# Patient Record
Sex: FEMALE | Race: WHITE | Employment: FULL TIME | URBAN - METROPOLITAN AREA
[De-identification: names, ages, dates, MRNs, and addresses within clinical notes are randomized per-mention and may not be internally consistent; named-entity substitution may affect disease eponyms.]

---

## 2023-01-27 ENCOUNTER — HOSPITAL ENCOUNTER (OUTPATIENT)
Dept: PHYSICAL THERAPY | Age: 43
Discharge: HOME | End: 2023-01-27
Payer: OTHER GOVERNMENT

## 2023-01-27 PROCEDURE — 97162 PT EVAL MOD COMPLEX 30 MIN: CPT

## 2023-01-27 PROCEDURE — 97110 THERAPEUTIC EXERCISES: CPT

## 2023-01-27 PROCEDURE — 97535 SELF CARE MNGMENT TRAINING: CPT

## 2023-01-27 NOTE — PROGRESS NOTES
Physical Therapy Evaluation        Patient Name: Wale Rojo  INIT:  : 1980  [x]  Patient  Verified  Payor:  / Plan: [facility] REGION / Product Type:  /    In time:331  Out time:427  Total Treatment Time (min): 64  Visit #: 1 of 12    Medicare/BCBS Only   Total Timed Codes (min):  56 1:1 Treatment Time:  64       Treatment Area: Stress incontinence (female) (male) [N39.3]    SUBJECTIVE  Pain Level (0-10 scale): 0/10  []constant []intermittent []improving []worsening []no change since onset    Any medication changes, allergies to medications, adverse drug reactions, diagnosis change, or new procedure performed?: [x] No    [] Yes (see summary sheet for update)  Subjective functional status/changes:     PLOF: active lifestyle, walk several days/week  Limitations to PLOF: can't sneeze/cough/laugh, run, or jump without leakage  Mechanism of Injury: c- section 2010   Current symptoms/Complaints: UI with sneeze/cough/laugh, run, or jump  Previous Treatment/Compliance: no  PMHx/Surgical Hx: 5 pregnancies/2 vaginal with episiotomy and 3rd degree with 2nd,  1 , 2 miscarriage D&C, lacrimal gland surgery,   Work Hx: [employer] for [employer]  Living Situation: live with  and 3 children (24, 25, and 12)  Pt Goals: to not pee on myself  Barriers: []pain []financial []time []transportation []other  Motivation: very  Substance use: []Alcohol [x]Tobacco []other:   Cognition: A & O x 3    Other:    Current urinary complaint  leaks with activity (stress induced)  urinary frequency  stress incontinence    Bladder complaint longevity:  5+ years    Bladder symptom progression:  worsened    Frequency of UI: 5-7 times per day    Pad use:  pantiliners: 2- 3 per day    Pad wetness when changed:  wet    Daytime urinary frequency:  Every 2-3 hour(s) during the day    Nocturia:  0x/ night    Patient has failed previous pelvic floor muscle training?   [] Yes    [x] No    Bowel function:  Regular BM, 5-7 per week  Stool Type (Preble): 1-4  Toileting position/modifications: stool    Fecal Incontinence present? none    Pain complaint:  none    Pain scale:  N/A    Pain description:  NA    Diet: low fruit    Fluid intake:    Fluid  Amount per day   Water 34 ounces   Caffeine 64 ounces  Mountain Dew (Diet)   Alcohol none                 Other Tests / Comments: KHADIJAH assessment: with head raise 3 fingers at umbilicus; 1\" above umbilicus 3 fingers; 1\" below umbilicus 3 fingers; poor tensile force noted and no abdominal doming noted with transfers/head raise     Infrasternal angle =   increased     Pelvic Floor Assessment  Patient was educated on pelvic floor anatomy, structure, and function and implications for current presentation of s/s. Patient consents to pelvic floor assessment.        External trigger point/ muscle tenderness:    Superficial PFM tenderness/restriction   Right/center Left   Bulbocavernosus (bulbospongiosus) none none   Ischiocavernosus none none   Superficial Transverse Perineal none none   Perineal body none    Levator Ani none none              PFM Screen:    Skin Integrity:  [x] Healthy [] Red  [] Labia Atrophy [] Fragile    Sensation: [x] Intact [] Diminished:    Muscle Bulk: [x] Symmetrical  [] Well-developed [] Atrophied:  []L   []R   []B    Prolapse: None visualized    Ability to actively bulge: yes  Bulge with cough yes; bulge no with knack prior to cough    Pelvic floor manual exam: Performed via internal digital vaginal assessment  female-upon vaginal palpation of the pelvic floor, the following was noted: good pelvic floor tone and tension with no pain upon palpation  Introitus restriction/TTP (reported as hands on a clock):   Mild TTP/restriction: 8 O'clock 1/10 pain    Deep PFM Tenderness/Restriction:    Right/center Left   pubococcygeus none none   Ischiococcygeus none none   Iliococcygeus none none   Obturator Internus none none   coccyx none             Pelvic floor MMT    PERF (Performance/Endurance/Repetitions//Flicks): 4/5/5//3  gluteal substitution noted    Pelvic muscle release pattern  2 - partial, delayed release        25 min [x]Eval                  []Re-Eval       23 min Self Care: Review and handout provided on the following: PFM anatomy, structure and function as it pertains to current s/s, complaints and condition. Reviewed expectations for PFPT and POC. Bladder fitness, HEP, KPFE, urge suppression, bowel routine, fibers, bowl massage   Rationale:  Increase awareness and understanding of current condition to improve patients ability to independently and effectively attain goals and progress towards long term management of current condition. 8 min Therapeutic Exercise:  [] See flow sheet :   Rationale: increase strength and improve coordination to improve the patients ability to maintain continence       With   [] TE   [] TA   [] neuro   [] other: Patient Education: [x] Review HEP    [] Progressed/Changed HEP based on:   [] positioning   [] body mechanics   [] transfers   [] heat/ice application    [] other:           Pain Level (0-10 scale) post treatment: 0/10    ASSESSMENT/Changes in Function: Patient is a 38year old female presenting to Physical Therapy with c/o stress urinary incontinence which is limiting ability function at previous level. Patient has slight pain complaints with palpation at 8 O'clock at introitus. Patient presents with decreased strength, coordination, and endurance of the pelvic floor muscles and decreased strength of postural stabilizers. Patient presents with limited understanding of bladder and bowel anatomy/function, dietary irritants, and urge suppression. I feel patient would benefit from skilled therapeutic intervention to optimize highest functional level possible.      Patient will continue to benefit from skilled PT services to modify and progress therapeutic interventions, address functional mobility deficits, address ROM deficits, address strength deficits, analyze and address soft tissue restrictions, analyze and cue movement patterns, analyze and modify body mechanics/ergonomics, and assess and modify postural abnormalities to attain remaining goals. [x]  See Plan of Care  []  See progress note/recertification  []  See Discharge Summary         Progress towards goals / Updated goals:  Short term goals: To be achieved in 6 weeks:  Patient will demonstrate proper dietary/fluid habits and urge suppression strategies that promote bladder and bowel health to aid in management of urinary urgency and incontinence. Status at eval: Patient consuming insufficient water (34 ounces) and too much Diet Mountain Dew (64 ounces)  and unaware of bladder fitness, dietary irritants and urge suppression strategies. Patient will report improvement in UI complaints evidence by a decrease in pad usage and/or amount of leakage by 50% to improve QOL. Status at eval: Patient using 2-3 pads (pantiliners) per day, generally wet (amount of leakage). Patient will be able to maintain pelvic floor contraction for 8 seconds, 8 repetitions with no accessory substitution or breath holding. Status at eval: PFMC 3 seconds, 7 repetitions with gluteal substitutions and breath holding    Patient will report urinating every 3 hours to improve QOL  Status at eval:  2-3 hours    Patient will report bowel movement consistency as 2-4 on the [place] scale to improve QOL  Status at eval: 1-4  Long term goals: To be achieved in 12 weeks:  Patient will report bladder continence 75% of the time with cough/sneeze/laugh and walking to the toilet. Status at eval: patient stress and urgency incontinence 5-7 times  per day    Patient will demonstrate pelvic floor muscle contraction 4/5 and ability to maintain contraction for 10 seconds, 10 repetitions.    Status at eval: PFM 3/5, 3 second hold, 7 repetitions, with gluteal substitution    Patient will demonstrate improvement of current complaints evidenced by the expected point  improvement in FOTO, Pelvic functional disability index score  Status at Eval: Patient attempted to take the FOTO but it timed out on her    Patient will report bowel movement consistency as 4 on the [place] scale to improve QOL  Status at eval: 1-4    Patient will demonstrate independence with management tools and exercise program that are beneficial for current condition in order to feel comfortable with Pelvic floor PT D/C and not fear exacerbation of current condition or symptoms returning.    Status at eval : patient is unaware of what exercises to do to decrease the symptoms and unaware of what activities to avoid to avoid exacerbation of current condition    PLAN  []  Upgrade activities as tolerated     [x]  Continue plan of care  []  Update interventions per flow sheet       []  Discharge due to:_  []  Other:_      Fredy Briceño PT 1/27/2023  3:24 PM

## 2023-01-27 NOTE — PROGRESS NOTES
[facility]  [address], [address]  Ph [phone]  Fx [phone]    Plan of Care/ Statement of Necessity for Physical Therapy Services    Patient name: Melissa Trevino Start of Care: 2023   Referral source: Quan Wang NP : 1980    Medical Diagnosis: Stress incontinence (female) (male) [N39.3]   Onset Date:2010    Treatment Diagnosis: Stress incontinence (female) (male) [N39.3]   Prior Hospitalization: see medical history Provider#: [id number]   Medications: Verified on Patient summary List    Comorbidities: 5 pregnancies/2 vaginal with episiotomy and 3rd degree with 2nd,  1 , 2 miscarriage D&C, lacrimal gland surgery   Prior Level of Function: active lifestyle, walk several days/week          The Plan of Care and following information is based on the information from the initial evaluation. Assessment/ key information: Patient is a 38year old female presenting to Physical Therapy with c/o stress urinary incontinence which is limiting ability function at previous level. Patient has slight pain complaints with palpation at 8 O'clock at introitus. Patient presents with decreased strength, coordination, and endurance of the pelvic floor muscles and decreased strength of postural stabilizers. Patient presents with limited understanding of bladder and bowel anatomy/function, dietary irritants, and urge suppression. I feel patient would benefit from skilled therapeutic intervention to optimize highest functional level possible. Patient will continue to benefit from skilled PT services to modify and progress therapeutic interventions, address functional mobility deficits, address ROM deficits, address strength deficits, analyze and address soft tissue restrictions, analyze and cue movement patterns, analyze and modify body mechanics/ergonomics, and assess and modify postural abnormalities to attain remaining goals.     Evaluation Complexity History HIGH Complexity :3+ comorbidities / personal factors will impact the outcome/ POC ; Examination HIGH Complexity : 4+ Standardized tests and measures addressing body structure, function, activity limitation and / or participation in recreation  ;Presentation MEDIUM Complexity : Evolving with changing characteristics  ; Overall Complexity Rating: MEDIUM    Problem List: Decreased pelvic floor mm awareness, Decreased pelvic floor mm strength, Use of accessory muscles, and Improper voiding habits  Treatment Plan may include any combination of the following: Therapeutic exercise, Urge suppression techniques, Neuromuscular re-education, Manual therapy, Physical agent/modality, and Patient education  Patient / Family readiness to learn indicated by: asking questions, trying to perform skills, and interest  Persons(s) to be included in education: patient (P)  Barriers to Learning/Limitations: None  Measures taken if barriers to learning: none  Patient Goal (s): to not pee on myself  Patient Self Reported Health Status: good  Rehabilitation Potential: excellent    Short term goals: To be achieved in 6 weeks:  Patient will demonstrate proper dietary/fluid habits and urge suppression strategies that promote bladder and bowel health to aid in management of urinary urgency and incontinence. Status at eval: Patient consuming insufficient water (34 ounces) and too much Diet Mountain Dew (64 ounces)  and unaware of bladder fitness, dietary irritants and urge suppression strategies. Patient will report improvement in UI complaints evidence by a decrease in pad usage and/or amount of leakage by 50% to improve QOL. Status at eval: Patient using 2-3 pads (pantiliners) per day, generally wet (amount of leakage). Patient will be able to maintain pelvic floor contraction for 8 seconds, 8 repetitions with no accessory substitution or breath holding.   Status at eval: PFMC 3 seconds, 7 repetitions with gluteal substitutions and breath holding     Patient will report urinating every 3 hours to improve QOL  Status at eval:  2-3 hours     Patient will report bowel movement consistency as 2-4 on the [place] scale to improve QOL  Status at eval: 1-4  Long term goals: To be achieved in 12 weeks:  Patient will report bladder continence 75% of the time with cough/sneeze/laugh and walking to the toilet. Status at eval: patient stress and urgency incontinence 5-7 times  per day     Patient will demonstrate pelvic floor muscle contraction 4/5 and ability to maintain contraction for 10 seconds, 10 repetitions. Status at eval: PFM 3/5, 3 second hold, 7 repetitions, with gluteal substitution     Patient will demonstrate improvement of current complaints evidenced by the expected point  improvement in FOTO, Pelvic functional disability index score  Status at Eval: Patient attempted to take the FOTO but it timed out on her     Patient will report bowel movement consistency as 4 on the [place] scale to improve QOL  Status at eval: 1-4     Patient will demonstrate independence with management tools and exercise program that are beneficial for current condition in order to feel comfortable with Pelvic floor PT D/C and not fear exacerbation of current condition or symptoms returning. Status at eval : patient is unaware of what exercises to do to decrease the symptoms and unaware of what activities to avoid to avoid exacerbation of current condition     Frequency / Duration: Patient to be seen 1 times per week for 12 weeks.     Patient/ Caregiver education and instruction: Diagnosis, prognosis, Proper Voiding Habits, Diet, Exercises, and Bladder Retraining   [x]  Plan of care has been reviewed with PTA    Certification Period: nazia Garcia, PT 1/27/2023 4:37 PM    ________________________________________________________________________    I certify that the above Therapy Services are being furnished while the patient is under my care. I agree with the treatment plan and certify that this therapy is necessary.     [de-identified] Signature:____________________  Date:____________Time:____________                                      Chester Herrera NP      Please sign and return to [facility]  [address] Cyn Lopez, [address]  Ph [phone]  Fx [phone]

## 2023-02-07 ENCOUNTER — HOSPITAL ENCOUNTER (OUTPATIENT)
Dept: PHYSICAL THERAPY | Age: 43
Discharge: HOME | End: 2023-02-07
Payer: OTHER GOVERNMENT

## 2023-02-07 PROCEDURE — 97110 THERAPEUTIC EXERCISES: CPT

## 2023-02-07 PROCEDURE — 97112 NEUROMUSCULAR REEDUCATION: CPT

## 2023-02-07 PROCEDURE — 9990 CHARGE CONVERSION

## 2023-02-07 NOTE — PROGRESS NOTES
PT DAILY TREATMENT NOTE    Patient Name: Willie Valencia  [id number]  : 1980  [x]  Patient  Verified  Payor:  / Plan: [facility] REGION / Product Type:  /    In time:5:30  Out time:6:23  Total Treatment Time (min): 53  Total Timed Codes (min): 48  Visit #: 2 of 12    Treatment Dx: Stress incontinence (female) (male) [N39.3]    SUBJECTIVE  Pain Level (0-10 scale): 0  Any medication changes, allergies to medications, adverse drug reactions, diagnosis change, or new procedure performed?: [x] No    [] Yes (see summary sheet for update)  Subjective functional status/changes:   [] No changes reported  Pt reports she has been able to hold back leaking a few times while sneezing. OBJECTIVE    25 min Therapeutic Activity: sEMG BFB testing for note of impairment []  See flow sheet :   Rationale: see above for rationale     30 min Neuromuscular Re-education:  [x]  See flow sheet :   Rationale: increase strength, improve coordination, and increase proprioception  to improve the patients ability to activate pelvic floor and core          With   [] TE   [x] TA   [x] neuro   [] other: Patient Education: [x] Review HEP    [] Progressed/Changed HEP based on:   [] positioning   [] body mechanics   [] transfers   [] heat/ice application    [] other:      Other Objective/Functional Measures:     sEMG Biofeedback Results:    Supine:   2s on/4s off   work average:  8.4 rest average: 4.2  5s on/10s off   work average:  7.4 rest average: 3.9  Seated:  2s on/4s off   work average:  3.9 rest average: 2.6  5s on/10s off   work average:  3.3 rest average: 2.2    Pain Level (0-10 scale) post treatment: 0    ASSESSMENT/Changes in Function: Patient tolerated treatment session well today. Patient had no complaints with addition of seated pelvic floor with overflow to exercise program to accomplish improved pelvic floor recruitment in sitting.   Patient continues to make good progress toward goals and would benefit from continued skilled PT intervention to address remaining deficits outlined in goals below. Patient will continue to benefit from skilled PT services to modify and progress therapeutic interventions, address functional mobility deficits, address ROM deficits, address strength deficits, analyze and address soft tissue restrictions, analyze and cue movement patterns, analyze and modify body mechanics/ergonomics, assess and modify postural abnormalities, and instruct in home and community integration to attain remaining goals. [x]  See Plan of Care  []  See progress note/recertification  []  See Discharge Summary         Progress towards goals / Updated goals:  Short term goals: To be achieved in 6 weeks:  Patient will demonstrate proper dietary/fluid habits and urge suppression strategies that promote bladder and bowel health to aid in management of urinary urgency and incontinence. Status at eval: Patient consuming insufficient water (34 ounces) and too much Diet Mountain Dew (64 ounces)  and unaware of bladder fitness, dietary irritants and urge suppression strategies. Current: pt increased her water intake by 16 oz and decreasing mountain dew intake by about 16 oz     Patient will report improvement in UI complaints evidence by a decrease in pad usage and/or amount of leakage by 50% to improve QOL. Status at eval: Patient using 2-3 pads (pantiliners) per day, generally wet (amount of leakage). Current: pt reports that there were a couple of times where she sneezed and didn't pee 2/7/2023 progressing     Patient will be able to maintain pelvic floor contraction for 8 seconds, 8 repetitions with no accessory substitution or breath holding.   Status at eval: PFMC 3 seconds, 7 repetitions with gluteal substitutions and breath holding  Current: pt reports the contractions are getting easier, she is able hold it longer 2/7/2023 progressing     Patient will report urinating every 3 hours to improve QOL  Status at eval:  2-3 hours     Patient will report bowel movement consistency as 2-4 on the [place] scale to improve QOL  Status at eval: 1-4    Long term goals: To be achieved in 12 weeks:  Patient will report bladder continence 75% of the time with cough/sneeze/laugh and walking to the toilet. Status at eval: patient stress and urgency incontinence 5-7 times  per day     Patient will demonstrate pelvic floor muscle contraction 4/5 and ability to maintain contraction for 10 seconds, 10 repetitions. Status at eval: PFM 3/5, 3 second hold, 7 repetitions, with gluteal substitution     Patient will demonstrate improvement of current complaints evidenced by the expected point  improvement in FOTO, Pelvic functional disability index score  Status at Eval: Patient attempted to take the FOTO but it timed out on her     Patient will report bowel movement consistency as 4 on the [place] scale to improve QOL  Status at eval: 1-4     Patient will demonstrate independence with management tools and exercise program that are beneficial for current condition in order to feel comfortable with Pelvic floor PT D/C and not fear exacerbation of current condition or symptoms returning. Status at eval : patient is unaware of what exercises to do to decrease the symptoms and unaware of what activities to avoid to avoid exacerbation of current condition    PLAN  []  Upgrade activities as tolerated     [x]  Continue plan of care  []  Update interventions per flow sheet       []  Discharge due to:_  []  Other:_      Charles Fuentes PT 2/7/2023  5:35 PM    No future appointments.

## 2023-02-27 ENCOUNTER — HOSPITAL ENCOUNTER (OUTPATIENT)
Facility: HOSPITAL | Age: 43
Setting detail: RECURRING SERIES
End: 2023-02-27
Payer: OTHER GOVERNMENT

## 2023-02-27 ENCOUNTER — TELEPHONE (OUTPATIENT)
Facility: HOSPITAL | Age: 43
End: 2023-02-27

## 2023-03-06 ENCOUNTER — TELEPHONE (OUTPATIENT)
Facility: HOSPITAL | Age: 43
End: 2023-03-06

## 2023-03-06 ENCOUNTER — APPOINTMENT (OUTPATIENT)
Facility: HOSPITAL | Age: 43
End: 2023-03-06
Payer: OTHER GOVERNMENT

## 2023-03-13 ENCOUNTER — HOSPITAL ENCOUNTER (OUTPATIENT)
Facility: HOSPITAL | Age: 43
Setting detail: RECURRING SERIES
Discharge: HOME | End: 2023-03-16
Payer: OTHER GOVERNMENT

## 2023-03-13 PROCEDURE — 97530 THERAPEUTIC ACTIVITIES: CPT

## 2023-03-13 PROCEDURE — 97112 NEUROMUSCULAR REEDUCATION: CPT

## 2023-03-13 NOTE — PROGRESS NOTES
PHYSICAL / OCCUPATIONAL THERAPY - DAILY TREATMENT NOTE (updated )    Patient Name: Cassandra Mead    Date: 3/13/2023    : 1980  Insurance: Payor:  EAST / Plan: [employer] EAST / Product Type: *No Product type* /      Patient  verified Yes     Visit #   Current / Total 3 12   Time   In / Out 5:30 6:23   Pain   In / Out 0 0   Subjective Functional Status/Changes: Pt reports she feels like the leaking is a little bit better. She hasn't kept up with the exercises. She's ready to get back into it now. She discovered that the first two days of her period make her very weak. Changes to:  Meds, Allergies, Med Hx, Sx Hx? If yes, update Summary List no       TREATMENT AREA =  No admission diagnoses are documented for this encounter. OBJECTIVE    Therapeutic Procedures: Tx Min Billable or 1:1 Min (if diff from Tx Min) Procedure, Rationale, Specifics   23  58120 Therapeutic Activity (timed):  use of dynamic activities replicating functional movements to increase ROM, strength, coordination, balance, and proprioception in order to improve patient's ability to progress to PLOF and address remaining functional goals. (see flow sheet as applicable)     Details if applicable:  discussion of symptoms and progress towards POC, sit to stand and step ups with PFM      30  08603 Neuromuscular Re-Education (timed):  improve balance, coordination, kinesthetic sense, posture, core stability and proprioception to improve patient's ability to develop conscious control of individual muscles and awareness of position of extremities in order to progress to PLOF and address remaining functional goals.  (see flow sheet as applicable)     Details if applicable:                 Details if applicable:            Details if applicable:     48  [address] Totals Reminder: bill using total billable min of TIMED therapeutic procedures (example: do not include dry needle or estim unattended, both untimed codes, in totals to left)  8-22 min = 1 unit; 23-37 min = 2 units; 38-52 min = 3 units; 53-67 min = 4 units; 68-82 min = 5 units   Total Total     TOTAL TREATMENT TIME:        53     [x]  Patient Education billed concurrently with other procedures   [x] Review HEP    [] Progressed/Changed HEP, detail:    [] Other detail:       Objective Information/Functional Measures/Assessment    Pt tolerated treatment well today, required verbal cue to draw in core during TA isometrics, tends to crunch/compensate with rectus abdominis. Patient will continue to benefit from skilled PT / OT services to modify and progress therapeutic interventions, analyze and address functional mobility deficits, analyze and address ROM deficits, analyze and address strength deficits, analyze and address soft tissue restrictions, analyze and cue for proper movement patterns, analyze and modify for postural abnormalities, analyze and address imbalance/dizziness, and instruct in home and community integration to address functional deficits and attain remaining goals. Progress toward goals / Updated goals:  []  See Progress Note/Recertification    Short term goals: To be achieved in 6 weeks:  Patient will demonstrate proper dietary/fluid habits and urge suppression strategies that promote bladder and bowel health to aid in management of urinary urgency and incontinence. Status at eval: Patient consuming insufficient water (34 ounces) and too much Diet Mountain Dew (64 ounces)  and unaware of bladder fitness, dietary irritants and urge suppression strategies. Current: pt increased her water intake by 4-5 16 oz of water per day 3/13/2023 goal met     Patient will report improvement in UI complaints evidence by a decrease in pad usage and/or amount of leakage by 50% to improve QOL. Status at eval: Patient using 2-3 pads (pantiliners) per day, generally wet (amount of leakage).    Current: pt reports that there were a couple of times where she sneezed and didn't pee, can make it through 3 sneezes without leaking 3/13/2023 progressing    Patient will be able to maintain pelvic floor contraction for 8 seconds, 8 repetitions with no accessory substitution or breath holding. Status at eval: PFMC 3 seconds, 7 repetitions with gluteal substitutions and breath holding  Current: pt reports the contractions are getting easier, she is able hold it longer 2/7/2023 progressing     Patient will report urinating every 3 hours to improve QOL  Status at eval:  2-3 hours  Current: pt is urinating every 3-4 hours throughout the workday, but more frequently at home 3/13/2023 goal nearly met      Patient will report bowel movement consistency as 2-4 on the Ringling scale to improve QOL  Status at eval: 1-4  Current: bowel movements have been fine 3/13/2023 goal met     Long term goals: To be achieved in 12 weeks:  Patient will report bladder continence 75% of the time with cough/sneeze/laugh and walking to the toilet. Status at eval: patient stress and urgency incontinence 5-7 times  per day  Current: pt reports that there were a couple of times where she sneezed and didn't pee, can make it through 3 sneezes without leaking 3/13/2023 progressing    Patient will demonstrate pelvic floor muscle contraction 4/5 and ability to maintain contraction for 10 seconds, 10 repetitions.    Status at eval: PFM 3/5, 3 second hold, 7 repetitions, with gluteal substitution  Current: pt reports the contractions are getting easier, she is able hold it longer 2/7/2023 progressing     Patient will demonstrate improvement of current complaints evidenced by the 12 point  improvement in FOTO, Pelvic functional disability index score  Status at Eval: Patient attempted to take the [address] but it timed out on her  Current: 47 3/13/2023      Patient will report bowel movement consistency as 4 on the Ringling scale to improve QOL  Status at eval: 1-4  Current: bowel movements have been fine 3/13/2023 goal met     Patient will demonstrate independence with management tools and exercise program that are beneficial for current condition in order to feel comfortable with Pelvic floor PT D/C and not fear exacerbation of current condition or symptoms returning.    Status at eval : patient is unaware of what exercises to do to decrease the symptoms and unaware of what activities to avoid to avoid exacerbation of current condition  Current: pt is learning tools to help herself 3/13/2023 progressing    PLAN  Yes  Continue plan of care  []  Upgrade activities as tolerated  []  Discharge due to :  []  Other:    Tammy Rodriguez PT    3/13/2023    1:23 PM    Future Appointments   Date Time Provider Rocio Saunders   3/13/2023  5:30 PM Tammy Rodriguez PT [facility]   3/20/2023  5:30 PM Tammy Rodriguez PT [facility]   3/27/2023  6:00 PM Preston Dominguez PT [facility]   4/3/2023  5:00 PM Preston Dominguez PT [facility]

## 2023-03-13 NOTE — PROGRESS NOTES
[facility]  [address] [place], [address]  Ph [phone]  Fx [phone]    Physical Therapy Progress Note  Patient name: Kailee Vogel Start of Care: 2023   Referral source: GOMEZ Anderson : 1980   Medical/Treatment Diagnosis: Stress incontinence (female) (male) [N39.3] Onset Date:2010   Prior Hospitalization: see medical history Provider#: [id number]   Medications: Verified on Patient Summary List     Comorbidities: 5 pregnancies/2 vaginal with episiotomy and 3rd degree with 2nd,  1 , 2 miscarriage D&C, lacrimal gland surgery   Prior Level of Function: active lifestyle, walk several days/week    Visits from Start of Care: 3    Missed Visits: 4    Updated Goals/Measure of Progress: 9 visits    Short term goals: To be achieved in 6 weeks:  Patient will demonstrate proper dietary/fluid habits and urge suppression strategies that promote bladder and bowel health to aid in management of urinary urgency and incontinence. Status at eval: Patient consuming insufficient water (34 ounces) and too much Diet Mountain Dew (64 ounces)  and unaware of bladder fitness, dietary irritants and urge suppression strategies. Current: pt increased her water intake by 4-5 16 oz of water per day 3/13/2023 goal met     Patient will report improvement in UI complaints evidence by a decrease in pad usage and/or amount of leakage by 50% to improve QOL. Status at eval: Patient using 2-3 pads (pantiliners) per day, generally wet (amount of leakage). Current: pt reports that there were a couple of times where she sneezed and didn't pee, can make it through 3 sneezes without leaking 3/13/2023 progressing     Patient will be able to maintain pelvic floor contraction for 8 seconds, 8 repetitions with no accessory substitution or breath holding.   Status at eval: PFMC 3 seconds, 7 repetitions with gluteal substitutions and breath holding  Current: pt reports the contractions are getting easier, she is able hold it longer 2/7/2023 progressing     Patient will report urinating every 3 hours to improve QOL  Status at eval:  2-3 hours  Current: pt is urinating every 3-4 hours throughout the workday, but more frequently at home 3/13/2023 goal nearly met      Patient will report bowel movement consistency as 2-4 on the Independence scale to improve QOL  Status at eval: 1-4  Current: bowel movements have been fine 3/13/2023 goal met     Long term goals: To be achieved in 12 weeks:  Patient will report bladder continence 75% of the time with cough/sneeze/laugh and walking to the toilet. Status at eval: patient stress and urgency incontinence 5-7 times  per day  Current: pt reports that there were a couple of times where she sneezed and didn't pee, can make it through 3 sneezes without leaking 3/13/2023 progressing     Patient will demonstrate pelvic floor muscle contraction 4/5 and ability to maintain contraction for 10 seconds, 10 repetitions. Status at eval: PFM 3/5, 3 second hold, 7 repetitions, with gluteal substitution  Current: pt reports the contractions are getting easier, she is able hold it longer 2/7/2023 progressing     Patient will demonstrate improvement of current complaints evidenced by the 12 point  improvement in FOTO, Pelvic functional disability index score  Status at Eval: Patient attempted to take the Kacey Mingle but it timed out on her  Current: 47 3/13/2023      Patient will report bowel movement consistency as 4 on the Independence scale to improve QOL  Status at eval: 1-4  Current: bowel movements have been fine 3/13/2023 goal met     Patient will demonstrate independence with management tools and exercise program that are beneficial for current condition in order to feel comfortable with Pelvic floor PT D/C and not fear exacerbation of current condition or symptoms returning.    Status at eval : patient is unaware of what exercises to do to decrease the symptoms and unaware of what activities to avoid to avoid exacerbation of current condition  Current: pt is learning tools to help herself 3/13/2023 progressing    Summary of Care/ Key Functional Changes: Pt shows better control over pelvic floor and core but is still very weak. She also has less episodes of leaking but is indeed still leaking. She continues to require skilled PT to return to PLOF. ASSESSMENT/RECOMMENDATIONS:    Continue per plan of care.      Thank you for this referral.   Ozella Buerger, PT 3/13/2023 5:56 PM

## 2023-03-20 ENCOUNTER — HOSPITAL ENCOUNTER (OUTPATIENT)
Facility: HOSPITAL | Age: 43
Setting detail: RECURRING SERIES
Discharge: HOME | End: 2023-03-23
Payer: OTHER GOVERNMENT

## 2023-03-20 PROCEDURE — 97530 THERAPEUTIC ACTIVITIES: CPT

## 2023-03-20 PROCEDURE — 97112 NEUROMUSCULAR REEDUCATION: CPT

## 2023-03-20 NOTE — PROGRESS NOTES
pt reports the contractions are getting easier, she is able hold it longer 2/7/2023 progressing     Patient will report urinating every 3 hours to improve QOL  Status at [facility]:  2-3 hours  Current: pt is urinating every 3-4 hours throughout the workday, but more frequently at home 3/13/2023 goal nearly met      Patient will report bowel movement consistency as 2-4 on the Adrian scale to improve QOL  Status at [facility]: 1-4  Current: bowel movements have been fine 3/13/2023 goal met     Long term goals: To be achieved in 12 weeks:  Patient will report bladder continence 75% of the time with cough/sneeze/laugh and walking to the toilet. Status at eval: patient stress and urgency incontinence 5-7 times  per day  Current: pt reports that there were a couple of times where she sneezed and didn't pee, can make it through 3 sneezes without leaking 3/13/2023 progressing     Patient will demonstrate pelvic floor muscle contraction 4/5 and ability to maintain contraction for 10 seconds, 10 repetitions. Status at [facility]: PFM 3/5, 3 second hold, 7 repetitions, with gluteal substitution  Current: pt reports the contractions are getting easier, she is able hold it longer 2/7/2023 progressing     Patient will demonstrate improvement of current complaints evidenced by the 12 point  improvement in FOTO, Pelvic functional disability index score  Status at [facility]: Patient attempted to take the [address] but it timed out on her  Current: 47 3/13/2023      Patient will report bowel movement consistency as 4 on the Adrian scale to improve QOL  Status at [facility]: 1-4  Current: bowel movements have been fine 3/13/2023 goal met     Patient will demonstrate independence with management tools and exercise program that are beneficial for current condition in order to feel comfortable with Pelvic floor PT D/C and not fear exacerbation of current condition or symptoms returning.    Status at eval : patient is unaware of what exercises to do to decrease the

## 2023-03-27 ENCOUNTER — HOSPITAL ENCOUNTER (OUTPATIENT)
Facility: HOSPITAL | Age: 43
Setting detail: RECURRING SERIES
Discharge: HOME | End: 2023-03-30
Payer: OTHER GOVERNMENT

## 2023-03-27 PROCEDURE — 97110 THERAPEUTIC EXERCISES: CPT

## 2023-03-27 PROCEDURE — 97112 NEUROMUSCULAR REEDUCATION: CPT

## 2023-03-27 PROCEDURE — 97530 THERAPEUTIC ACTIVITIES: CPT

## 2023-03-27 NOTE — PROGRESS NOTES
PHYSICAL / OCCUPATIONAL THERAPY - DAILY TREATMENT NOTE (updated )    Patient Name: Alexandra Camacho    Date: 3/27/2023    : 1980  Insurance: Payor:  EAST / Plan: [employer] EAST / Product Type: *No Product type* /      Patient  verified Yes     Visit #   Current / Total 3 12   Time   In / Out 600 653   Pain   In / Out 0/10 010   Subjective Functional Status/Changes: Patient reports a normal voiding interval   Changes to:  Meds, Allergies, Med Hx, Sx Hx? If yes, update Summary List no       TREATMENT AREA =  Stress incontinence (female) (male) [N39.3]    OBJECTIVE      Therapeutic Procedures: Tx Min Billable or 1:1 Min (if diff from Tx Min) Procedure, Rationale, Specifics   15  04461 Therapeutic Exercise (timed):  increase ROM, strength, coordination, balance, and proprioception to improve patient's ability to progress to PLOF and address remaining functional goals. (see flow sheet as applicable)     Details if applicable:       13  75291 Neuromuscular Re-Education (timed):  improve balance, coordination, kinesthetic sense, posture, core stability and proprioception to improve patient's ability to develop conscious control of individual muscles and awareness of position of extremities in order to progress to PLOF and address remaining functional goals. (see flow sheet as applicable)     Details if applicable:     25  96196 Therapeutic Activity (timed):  use of dynamic activities replicating functional movements to increase ROM, strength, coordination, balance, and proprioception in order to improve patient's ability to progress to PLOF and address remaining functional goals.   (see flow sheet as applicable)     Details if applicable:       {[facility] Procedures:31846}     Details if applicable:       {[facility] Procedures:08742}     Details if applicable:     48  [address] Totals Reminder: bill using total billable min of TIMED therapeutic procedures (example: do not include dry needle or

## 2023-04-03 ENCOUNTER — HOSPITAL ENCOUNTER (OUTPATIENT)
Facility: HOSPITAL | Age: 43
Setting detail: RECURRING SERIES
Discharge: HOME | End: 2023-04-06
Payer: OTHER GOVERNMENT

## 2023-04-03 PROCEDURE — 97112 NEUROMUSCULAR REEDUCATION: CPT

## 2023-04-03 PROCEDURE — 97110 THERAPEUTIC EXERCISES: CPT

## 2023-04-03 PROCEDURE — 97530 THERAPEUTIC ACTIVITIES: CPT

## 2023-04-03 NOTE — PROGRESS NOTES
2 units; 38-52 min = 3 units; 53-67 min = 4 units; 68-82 min = 5 units   Total Total     TOTAL TREATMENT TIME:        53     [x]  Patient Education billed concurrently with other procedures   [x] Review HEP    [] Progressed/Changed HEP, detail:    [] Other detail:       Objective Information/Functional Measures/Assessment  Biofeedback expectations and implications were reviewed with patient prior to intervention,   Performed sEMG with perianal electrodes as follows:    Position, initial resting tone Work/Rest/Reps Comments   Sitting   5/10/10     Ave Resting at 3.9   uV    Min resting at  1.7   uV    Ave Work at   12.1     [employer] [facility] interval 8.23 uV   Sitting   2/4/10   Ave Resting at 4.7   uV    Min resting at   1.9  [facility] Work [facility]   [facility]    [facility] Work at [facility] interval 6.08 uV      Patient is a 38year old female who has attended 6 physical therapy appointments for stress urinary incontinence and urinary frequency. Patient is voiding at normal intervals and has appropriate stool consistency. She reports that she is about 20% better for UI. Her surface EMG demonstrates increased ability to recruit motor units. She still demonstrates decreased endurance. Patient will continue to benefit from skilled PT services to modify and progress therapeutic interventions, analyze and address functional mobility deficits, analyze and address ROM deficits, analyze and address strength deficits, analyze and address soft tissue restrictions, analyze and cue for proper movement patterns, and analyze and modify for postural abnormalities to address functional deficits and attain remaining goals. Progress toward goals / Updated goals:  []  See Progress Note/Recertification    Short term goals:  To be achieved in 3 weeks:  Patient will demonstrate proper dietary/fluid habits and urge suppression strategies that promote bladder and bowel health to aid in management of urinary urgency and
proper movement patterns, and analyze and modify for postural abnormalities to address functional deficits and attain remaining goals. ASSESSMENT/RECOMMENDATIONS:    Continue per plan of care.      Thank you for this referral.   Osman Horowitz, PT 4/3/2023 2:55 PM

## 2023-04-18 ENCOUNTER — HOSPITAL ENCOUNTER (OUTPATIENT)
Facility: HOSPITAL | Age: 43
Setting detail: RECURRING SERIES
Discharge: HOME | End: 2023-04-21
Payer: OTHER GOVERNMENT

## 2023-04-18 PROCEDURE — 97530 THERAPEUTIC ACTIVITIES: CPT

## 2023-04-18 PROCEDURE — 97112 NEUROMUSCULAR REEDUCATION: CPT

## 2023-04-18 PROCEDURE — 97110 THERAPEUTIC EXERCISES: CPT

## 2023-04-18 NOTE — PROGRESS NOTES
suppression exercises  [x]  Other: Pressure management education        8  51448 Therapeutic Exercise (timed):  increase ROM, strength, coordination, balance, and proprioception to improve patient's ability to progress to PLOF and address remaining functional goals. (see flow sheet as applicable)     Details if applicable:    []  Pelvic floor strengthening                 []  Pelvic floor downtraining  []  Quality pelvic floor contractions       []  Relaxation techniques  []  Urge suppression exercises  [x]  Other: Core Strengthening          Details if applicable:     39  [facility] Totals Reminder: bill using total billable min of TIMED therapeutic procedures (example: do not include dry needle or estim unattended, both untimed codes, in totals to left)  8-22 min = 1 unit; 23-37 min = 2 units; 38-52 min = 3 units; 53-67 min = 4 units; 68-82 min = 5 units   Total Total     [x]  Patient Education billed concurrently with other procedures   [x] Review HEP    [] Progressed/Changed HEP, detail:    [] Other detail:       Objective Information/Functional Measures/Assessment    Assessment: Patient educated on pressure management techniques to decrease strain on PFMs during repeated sneezing. Also educated to pre-contract PFMs during exercises to improve coordination and decrease max contraction of Knack to attempt submx contraction over duration of sneezes. Patient demonstrates abdominals weakness contributing to decreased ability to manage pressure and symptoms, including difficulty breathing away from belly. Progressed rectus and core strengthening exercises and gave HEP. Suggestions for next visit include continue focused core strength    Patient reports no adverse reactions to therapy. Patient is making good progress towards goals and will benefit from continued therapy to achieve goals and maximize function/restore PLOF.       Patient will continue to benefit from skilled PT services to modify and progress therapeutic

## 2023-04-27 ENCOUNTER — APPOINTMENT (OUTPATIENT)
Facility: HOSPITAL | Age: 43
End: 2023-04-27
Payer: OTHER GOVERNMENT

## 2023-05-01 ENCOUNTER — TELEPHONE (OUTPATIENT)
Facility: HOSPITAL | Age: 43
End: 2023-05-01

## 2023-05-01 ENCOUNTER — HOSPITAL ENCOUNTER (OUTPATIENT)
Facility: HOSPITAL | Age: 43
Setting detail: RECURRING SERIES
End: 2023-05-01
Payer: OTHER GOVERNMENT

## 2023-05-08 ENCOUNTER — HOSPITAL ENCOUNTER (OUTPATIENT)
Facility: HOSPITAL | Age: 43
Setting detail: RECURRING SERIES
Discharge: HOME | End: 2023-05-11
Payer: OTHER GOVERNMENT

## 2023-05-08 PROCEDURE — 97112 NEUROMUSCULAR REEDUCATION: CPT

## 2023-05-08 PROCEDURE — 97530 THERAPEUTIC ACTIVITIES: CPT

## 2023-05-08 PROCEDURE — 97110 THERAPEUTIC EXERCISES: CPT

## 2023-05-08 NOTE — PROGRESS NOTES
PHYSICAL / OCCUPATIONAL THERAPY - DAILY TREATMENT NOTE (updated )    Patient Name: Chapin Fleming    Date: 2023    : 1980  Insurance: Payor:  EAST / Plan: [employer] EAST / Product Type: *No Product type* /      Patient  verified Yes     Visit #   Current / Total 8 12   Time   In / Out 513 553   Pain   In / Out 0 0   Subjective Functional Status/Changes: Patient reports no leakage with sneezing   Changes to:  Meds, Allergies, Med Hx, Sx Hx? If yes, update Summary List no       TREATMENT AREA =  Stress incontinence (female) (male) [N39.3]    OBJECTIVE        Therapeutic Procedures: Tx Min Billable or 1:1 Min (if diff from Tx Min) Procedure, Rationale, Specifics   24  52099 Therapeutic Exercise (timed):  increase ROM, strength, coordination, balance, and proprioception to improve patient's ability to progress to PLOF and address remaining functional goals. (see flow sheet as applicable)     Details if applicable:       8  36661 Neuromuscular Re-Education (timed):  improve balance, coordination, kinesthetic sense, posture, core stability and proprioception to improve patient's ability to develop conscious control of individual muscles and awareness of position of extremities in order to progress to PLOF and address remaining functional goals. (see flow sheet as applicable)     Details if applicable:     8  18962 Therapeutic Activity (timed):  use of dynamic activities replicating functional movements to increase ROM, strength, coordination, balance, and proprioception in order to improve patient's ability to progress to PLOF and address remaining functional goals.   (see flow sheet as applicable)     Details if applicable:            Details if applicable:            Details if applicable:     36  [facility] Totals Reminder: bill using total billable min of TIMED therapeutic procedures (example: do not include dry needle or estim unattended, both untimed codes, in totals to left)  8-22 min = 1

## 2023-05-08 NOTE — PROGRESS NOTES
[facility]  [address] Kami Romero, [address]  Ph [phone]  Fx [phone]    Physical Therapy Progress Note  Patient name: Mely Lindo Start of Care: 2023   Referral source: LIZZIE Monge* : 1980   Medical/Treatment Diagnosis: Stress incontinence (female) (male) [N39.3] Onset Date:2010   Prior Hospitalization: see medical history Provider#: [id number]   Medications: Verified on Patient Summary List      Comorbidities: 5 pregnancies/2 vaginal with episiotomy and 3rd degree with 2nd,  1 , 2 miscarriage D&C, lacrimal gland surgery   Prior Level of Function: active lifestyle, walk several days/week    Visits from Start of Care: 8    Missed Visits: 3    Updated Goals/Measure of Progress: To be achieved in 8 treatments:    Short term goals: To be achieved in 3 weeks:  Patient will demonstrate proper dietary/fluid habits and urge suppression strategies that promote bladder and bowel health to aid in management of urinary urgency and incontinence. Status at eval: Patient consuming insufficient water (34 ounces) and too much Diet Mountain Dew (64 ounces)  and unaware of bladder fitness, dietary irritants and urge suppression strategies. Current: pt increased her water intake by 4-5 16 oz of water per day 3/13/2023 goal met     Patient will report improvement in UI complaints evidence by a decrease in pad usage and/or amount of leakage by 50% to improve QOL. Status at eval: Patient using 2-3 pads (pantiliners) per day, generally wet (amount of leakage). Current: pt reports that there were a couple of times where she sneezed and didn't pee, can make it through 3 sneezes without leaking 3/13/2023 progressing  Current:  Patient reports feeling about 20% better and is able to get through 5 sneezes sometimes without leakage.   She still has leakage with prolonged coughing Progressing  4/3/2023  Current:  Patient reports being able to sneeze without

## 2023-05-15 ENCOUNTER — TELEPHONE (OUTPATIENT)
Facility: HOSPITAL | Age: 43
End: 2023-05-15

## 2023-05-22 ENCOUNTER — TELEPHONE (OUTPATIENT)
Facility: HOSPITAL | Age: 43
End: 2023-05-22

## 2023-05-22 ENCOUNTER — APPOINTMENT (OUTPATIENT)
Facility: HOSPITAL | Age: 43
End: 2023-05-22
Payer: OTHER GOVERNMENT

## 2023-05-30 ENCOUNTER — HOSPITAL ENCOUNTER (OUTPATIENT)
Facility: HOSPITAL | Age: 43
Setting detail: RECURRING SERIES
Discharge: HOME | End: 2023-06-02
Payer: OTHER GOVERNMENT

## 2023-05-30 PROCEDURE — 97530 THERAPEUTIC ACTIVITIES: CPT

## 2023-05-30 PROCEDURE — 97110 THERAPEUTIC EXERCISES: CPT

## 2023-05-30 PROCEDURE — 97112 NEUROMUSCULAR REEDUCATION: CPT

## 2023-05-30 NOTE — PROGRESS NOTES
movements have been fine 3/13/2023 goal met     Patient will demonstrate independence with management tools and exercise program that are beneficial for current condition in order to feel comfortable with Pelvic floor PT D/C and not fear exacerbation of current condition or symptoms returning. Status at eval : patient is unaware of what exercises to do to decrease the symptoms and unaware of what activities to avoid to avoid exacerbation of current condition  Current: pt is learning tools to help herself 3/13/2023 progressing  Current reports fair compliance with HEP.  4/3/2023 Progressing  Current:  Patient reports good compliance with HEP  5/8/2023        Summary of Care/ Key Functional Changes: Pt cancelled appointment when PN was due. PN was written at earliest opportunity. Patient is a 38year old female who has attended 8 physical therapy appointments for stress urinary incontinence, urinary frequency and constipation. Patient reports voiding at normal intervals and no longer c/o constipation. Patient reports decreased UI with sneezing but does with hard cough or hard laughter. Patient will continue to benefit from skilled PT services to modify and progress therapeutic interventions, analyze and address functional mobility deficits, analyze and address ROM deficits, analyze and address strength deficits, analyze and address soft tissue restrictions, analyze and cue for proper movement patterns, and analyze and modify for postural abnormalities to address functional deficits and attain remaining goals.     PLAN  Yes  Continue plan of care  []  Upgrade activities as tolerated  []  Discharge due to :  []  Other:    Manuel Archer PT    5/30/2023    4:19 PM    Future Appointments   Date Time Provider Rocio Saunders   5/30/2023  5:10 PM Manuel Archer PT [facility]

## 2023-06-28 ENCOUNTER — TELEPHONE (OUTPATIENT)
Facility: HOSPITAL | Age: 43
End: 2023-06-28

## 2024-03-26 NOTE — TELEPHONE ENCOUNTER
NS-LVM with reminder of next appointment and stated that we will discharge if she is unable to come